# Patient Record
Sex: MALE | Race: AMERICAN INDIAN OR ALASKA NATIVE
[De-identification: names, ages, dates, MRNs, and addresses within clinical notes are randomized per-mention and may not be internally consistent; named-entity substitution may affect disease eponyms.]

---

## 2019-08-24 ENCOUNTER — HOSPITAL ENCOUNTER (EMERGENCY)
Dept: HOSPITAL 5 - ED | Age: 29
Discharge: HOME | End: 2019-08-24
Payer: SELF-PAY

## 2019-08-24 VITALS — DIASTOLIC BLOOD PRESSURE: 74 MMHG | SYSTOLIC BLOOD PRESSURE: 121 MMHG

## 2019-08-24 DIAGNOSIS — R11.2: ICD-10-CM

## 2019-08-24 DIAGNOSIS — R19.7: Primary | ICD-10-CM

## 2019-08-24 LAB
ALBUMIN SERPL-MCNC: 4.6 G/DL (ref 3.9–5)
ALT SERPL-CCNC: 23 UNITS/L (ref 7–56)
BAND NEUTROPHILS # (MANUAL): 0 K/MM3
BUN SERPL-MCNC: 16 MG/DL (ref 9–20)
BUN/CREAT SERPL: 18 %
CALCIUM SERPL-MCNC: 9.3 MG/DL (ref 8.4–10.2)
HCT VFR BLD CALC: 47.8 % (ref 35.5–45.6)
HEMOLYSIS INDEX: 9
HGB BLD-MCNC: 16.1 GM/DL (ref 11.8–15.2)
MCHC RBC AUTO-ENTMCNC: 34 % (ref 32–34)
MCV RBC AUTO: 91 FL (ref 84–94)
MYELOCYTES # (MANUAL): 0 K/MM3
PLATELET # BLD: 175 K/MM3 (ref 140–440)
PROMYELOCYTES # (MANUAL): 0 K/MM3
RBC # BLD AUTO: 5.24 M/MM3 (ref 3.65–5.03)
TOTAL CELLS COUNTED BLD: 100

## 2019-08-24 PROCEDURE — 80053 COMPREHEN METABOLIC PANEL: CPT

## 2019-08-24 PROCEDURE — 36415 COLL VENOUS BLD VENIPUNCTURE: CPT

## 2019-08-24 PROCEDURE — 85025 COMPLETE CBC W/AUTO DIFF WBC: CPT

## 2019-08-24 PROCEDURE — 85007 BL SMEAR W/DIFF WBC COUNT: CPT

## 2019-08-24 NOTE — EMERGENCY DEPARTMENT REPORT
Vomiting/Diarrhea





- HPI


Chief Complaint: Nausea/Vomiting/Diarrhea


Stated Complaint: DEHYDRATION/DIARRHEA


Time Seen by Provider: 08/24/19 19:11


Severity: moderate


Nausea/Vomiting Severity: Mild


Diarrhea Severity: Moderate


Pain Severity: None


Symptoms: Yes Watery Diarrhea, Yes Able to Tolerate Fluids, Yes Recent Unusual 

Foods (chicken "bad"), Yes Contacts w/ Similar Symptoms, No Bloody diarrhea, No 

Fever, No Recent Untreated Water, No Recent use of Antibiotics, No Family w/ 

Similar Symptoms, No Rash, No Hematuria, No Recent URI Symptoms





ED Review of Systems


ROS: 


Stated complaint: DEHYDRATION/DIARRHEA


Other details as noted in HPI





Constitutional: denies: chills, fever


Eyes: denies: eye pain, eye discharge, vision change


ENT: denies: ear pain, throat pain


Respiratory: denies: cough, shortness of breath, wheezing


Cardiovascular: denies: chest pain, palpitations


Endocrine: no symptoms reported


Gastrointestinal: nausea, vomiting, diarrhea.  denies: abdominal pain, 

constipation, hematemesis, melena, hematochezia


Genitourinary: denies: urgency, dysuria


Musculoskeletal: denies: back pain, joint swelling, arthralgia


Skin: denies: rash, lesions


Neurological: denies: headache, weakness, paresthesias


Psychiatric: denies: anxiety, depression


Hematological/Lymphatic: denies: easy bleeding, easy bruising





ED Past Medical Hx





- Past Medical History


Previous Medical History?: No





- Surgical History


Past Surgical History?: No





- Social History


Smoking Status: Never Smoker


Substance Use Type: None





- Medications


Home Medications: 


                                Home Medications











 Medication  Instructions  Recorded  Confirmed  Last Taken  Type


 


Diphenoxylate/Atropine [Lomotil] 1 tab PO QID PRN #12 tablet 08/24/19  Unknown 

Rx














Vomiting Diarrhea Exam





- Exam


General: 


Vital signs noted. No distress. Alert and acting appropriately.





HEENT: Yes Moist Mucous Membranes, No Pharyngeal Erythema, No Pharyngeal 

Exudates, No Rhinorrhea, No Conjuctival Injection, No Frontal Tenderness, No 

Maxillary Tenderness


Neck: No Adenopathy, No Rigidity


Lungs: Yes Clear Lung Sounds, Yes Good Air Exchange, No Wheezes, No Stridor, No 

Cough, No Nasal Flaring, No Retractions, No Use of Accessory Muscles


Heart exam: Regular: Yes, Murmur: No, Tachycardia: No


Abdomen: Tenderness: No, Peritoneal Signs: No, Distention: No, Hyperactive Bowel

 sounds: Yes


Skin exam: Rash: No, Edema: No, Normal turgor: Yes


Neurologic: 


Alert and oriented, no deficits.








Musculoskeletal: 


Unremarkable.











ED Course


                                   Vital Signs











  08/24/19





  18:06


 


Temperature 98.6 F


 


Pulse Rate 95 H


 


Respiratory 20





Rate 


 


Blood Pressure 121/74


 


O2 Sat by Pulse 987 H





Oximetry 














ED Medical Decision Making





- Lab Data


Result diagrams: 


                                 08/24/19 18:20





                                 08/24/19 18:20





Labs











  08/24/19 08/24/19





  18:20 18:20


 


WBC  10.9 


 


RBC  5.24 H 


 


Hgb  16.1 H 


 


Hct  47.8 H 


 


MCV  91 


 


MCH  31 


 


MCHC  34 


 


RDW  13.0 L 


 


Plt Count  175 


 


Seg Neutrophils %  Np 


 


Sodium   140


 


Potassium   3.8


 


Chloride   103.1


 


Carbon Dioxide   22


 


Anion Gap   19


 


BUN   16


 


Creatinine   0.9


 


Estimated GFR   > 60


 


BUN/Creatinine Ratio   18


 


Glucose   115 H


 


Calcium   9.3


 


Total Bilirubin   2.70 H


 


AST   20


 


ALT   23


 


Alkaline Phosphatase   88


 


Total Protein   8.0


 


Albumin   4.6


 


Albumin/Globulin Ratio   1.4














- Medical Decision Making


symptoms improved pt tolerated po intake without n/v , request lomotil for dc, 

declines zofran , dicussed bilirubin level pt will follow up with GI in 2-3 days

 ,continue to hydrate at home return to ed if symptoms worsen, pt verbalized 

agreement and understanding of discharge plan. 





Critical care attestation.: 


If time is entered above; I have spent that time in minutes in the direct care 

of this critically ill patient, excluding procedure time.








ED Disposition


Clinical Impression: 


 Nausea vomiting and diarrhea





Disposition: DC-01 TO HOME OR SELFCARE


Is pt being admited?: No


Does the pt Need Aspirin: No


Condition: Stable


Instructions:  Acute Nausea and Vomiting (ED)


Prescriptions: 


Diphenoxylate/Atropine [Lomotil] 1 tab PO QID PRN #12 tablet


 PRN Reason: diarrhea 


Referrals: 


[facility] [Provider Group] - 3-5 Days


[facility] [Primary Care Provider] - 3-5 Days


Forms:  Work/School Release Form(ED)


Time of Disposition: 20:15